# Patient Record
Sex: FEMALE | Race: WHITE | NOT HISPANIC OR LATINO | Employment: FULL TIME | URBAN - METROPOLITAN AREA
[De-identification: names, ages, dates, MRNs, and addresses within clinical notes are randomized per-mention and may not be internally consistent; named-entity substitution may affect disease eponyms.]

---

## 2020-08-04 ENCOUNTER — TELEPHONE (OUTPATIENT)
Dept: SCHEDULING | Facility: IMAGING CENTER | Age: 45
End: 2020-08-04

## 2020-09-20 NOTE — PROGRESS NOTES
Subjective:     CC:  Diagnoses of Need for vaccination, Wrist lesion, PCOS (polycystic ovarian syndrome), Screening for tuberculosis, and IUD (intrauterine device) in place were pertinent to this visit.    HISTORY OF THE PRESENT ILLNESS: Patient is a 45 y.o. female. This pleasant patient is here today to establish care and discuss wrist lesion. Her prior PCP was Dr. Amilcar Hdz.    Wrist lesion  This is a new condition.  Onset:several months ago.    Location: right posterior wrist  Duration: chronic, slightly decreasing in size  Quality: dose not hurt  Modifying factors: No known improving or worsening factors.  She reported it just appeared  Precipitating trauma: No known trauma  Associated symptoms: No weakness or numbness  Home treatments: none      PCOS (polycystic ovarian syndrome)  Chronic, improved with losing weight, exercise.  She uses prometrium around her cycles to help with dibilating menstrual pain. She does not know the dose she is taking.  She has an IUD.  Symptoms improve with exercise.    Screening for tuberculosis  No night sweats, fevers, cough.  No abnormal weight loss.  She would like TB testing for pre-employement evaluation.      Allergies: Patient has no allergy information on record.    Current Outpatient Medications Ordered in Epic   Medication Sig Dispense Refill   • Progesterone Micronized (PROMETRIUM PO) Take 1 Tab by mouth as needed.       No current Epic-ordered facility-administered medications on file.        History reviewed. No pertinent past medical history.    Past Surgical History:   Procedure Laterality Date   • CHOLECYSTECTOMY  2015       Social History     Tobacco Use   • Smoking status: Never Smoker   • Smokeless tobacco: Never Used   Substance Use Topics   • Alcohol use: Yes     Frequency: 2-3 times a week     Drinks per session: 1 or 2     Comment: 2 kombucha drinks   • Drug use: Never       Social History     Social History Narrative   • Not on file       Family  "History   Problem Relation Age of Onset   • Hypertension Mother    • Stroke Father 70   • Diabetes Father    • Heart Disease Maternal Grandmother    • Heart Disease Maternal Grandfather    • Stroke Paternal Grandfather    • Cancer Neg Hx        Health Maintenance: Pt reports getting pap and mammogram in 6/2020    ROS:   Gen: no fevers/chills, no changes in weight  Eyes: no changes in vision  ENT: no sore throat, no hearing loss, no bloody nose  Pulm: no sob, no cough  CV: no chest pain, no palpitations  GI: no nausea/vomiting, no diarrhea  : no dysuria  MSk: no myalgias  Skin: no rash  Neuro: no headaches, no numbness/tingling  Heme/Lymph: no easy bruising      Objective:     Exam: /68 (BP Location: Left arm, Patient Position: Sitting, BP Cuff Size: Adult)   Pulse 79   Temp 36.7 °C (98 °F) (Temporal)   Resp 16   Ht 1.702 m (5' 7\")   Wt 83.9 kg (185 lb)   SpO2 96%  Body mass index is 28.98 kg/m².    General: Normal appearing. No distress.  HEENT: Normocephalic.  Canals are clear bilaterally, tympanic membranes are benign,nasal and OP examinations deferred given COVID19 exposure risk  Eyes: Eyes conjunctiva clear lids without ptosis, pupils equal and reactive to light accommodation, ears normal shape and contour  Neck: Supple. Thyroid is not enlarged.  Pulmonary: Clear to ausculation.  Normal effort. No rales, ronchi, or wheezing.  Cardiovascular: Regular rate and rhythm without murmur.   Abdomen: Soft, nontender, nondistended. Normal bowel sounds. Liver and spleen are not palpable  Neurologic: No gross motor deficits  Lymph: No cervical or supraclavicular lymph nodes are palpable  Skin: Warm and dry.  No obvious lesions.  Musculoskeletal: Normal gait. No extremity cyanosis, clubbing, or edema.  Wrist/Hand: +right dorsal wrist localized swelling that is soft, round, nontender, nonerythematous. Tenderness to palpation negative. No tenderness at snuffbox. Range of motion intact. Strength and sensation " intact.  Good  strength. 2+ radial pulse.   Psych: Normal mood and affect. Alert and oriented x3. Judgment and insight is normal. PHQ9 = 0    Assessment & Plan:   45 y.o. female with the following -    1. Need for vaccination  Pt desires vaccination.  Risks and benefits discussed.  No contraindications today.  Vaccine given.  Follow-up precautions discussed.  - Influenza Vaccine Quad Injection (PF)    2. Wrist lesion  New issue, improving.  US ordered and plan for referral based on results.  - US-EXTREMITY NON VASCULAR UNILATERAL RIGHT; Future    Addendum: US consistent with cyst, likely ganglion cyst.  Referral for surgeon placed for drainage vs removal.    3. PCOS (polycystic ovarian syndrome)  Chronic, improved symptoms with diet and exercise and weight loss.  She has cramping that is severe around her periods but this improves with oral progesterone.  Requested her outside records prior to refilling.    4. Screening for tuberculosis  Pt is asymptomatic and needs screening for pre-employement.  PPD placed and pt to return in 2 days to have it read.  - PPD SKIN TEST    5. IUD (intrauterine device) in place  Pt has a copper IUD that needs removed.  Her partner has had a vasectomy, so she does not desire replacement birth control at this time.  - REFERRAL TO OB/GYN    Return in about 2 months (around 11/21/2020).    Please note that this dictation was created using voice recognition software. I have made every reasonable attempt to correct obvious errors, but I expect that there are errors of grammar and possibly content that I did not discover before finalizing the note.

## 2020-09-21 ENCOUNTER — OFFICE VISIT (OUTPATIENT)
Dept: MEDICAL GROUP | Facility: MEDICAL CENTER | Age: 45
End: 2020-09-21
Payer: COMMERCIAL

## 2020-09-21 VITALS
DIASTOLIC BLOOD PRESSURE: 68 MMHG | WEIGHT: 185 LBS | OXYGEN SATURATION: 96 % | RESPIRATION RATE: 16 BRPM | TEMPERATURE: 98 F | SYSTOLIC BLOOD PRESSURE: 100 MMHG | HEIGHT: 67 IN | BODY MASS INDEX: 29.03 KG/M2 | HEART RATE: 79 BPM

## 2020-09-21 DIAGNOSIS — Z11.1 SCREENING FOR TUBERCULOSIS: ICD-10-CM

## 2020-09-21 DIAGNOSIS — Z97.5 IUD (INTRAUTERINE DEVICE) IN PLACE: ICD-10-CM

## 2020-09-21 DIAGNOSIS — M25.9 WRIST LESION: Primary | ICD-10-CM

## 2020-09-21 DIAGNOSIS — E28.2 PCOS (POLYCYSTIC OVARIAN SYNDROME): ICD-10-CM

## 2020-09-21 DIAGNOSIS — Z23 NEED FOR VACCINATION: ICD-10-CM

## 2020-09-21 PROCEDURE — 90686 IIV4 VACC NO PRSV 0.5 ML IM: CPT | Performed by: FAMILY MEDICINE

## 2020-09-21 PROCEDURE — 90471 IMMUNIZATION ADMIN: CPT | Performed by: FAMILY MEDICINE

## 2020-09-21 PROCEDURE — 86580 TB INTRADERMAL TEST: CPT | Performed by: FAMILY MEDICINE

## 2020-09-21 PROCEDURE — 99203 OFFICE O/P NEW LOW 30 MIN: CPT | Mod: 25 | Performed by: FAMILY MEDICINE

## 2020-09-21 SDOH — HEALTH STABILITY: MENTAL HEALTH: HOW MANY STANDARD DRINKS CONTAINING ALCOHOL DO YOU HAVE ON A TYPICAL DAY?: 1 OR 2

## 2020-09-21 SDOH — HEALTH STABILITY: MENTAL HEALTH: HOW OFTEN DO YOU HAVE A DRINK CONTAINING ALCOHOL?: 2-3 TIMES A WEEK

## 2020-09-21 ASSESSMENT — PATIENT HEALTH QUESTIONNAIRE - PHQ9: CLINICAL INTERPRETATION OF PHQ2 SCORE: 0

## 2020-09-21 NOTE — ASSESSMENT & PLAN NOTE
Chronic, improved with losing weight, exercise.  She uses prometrium around her cycles to help with dibilating menstrual pain. She does not know the dose she is taking.  She has an IUD.  Symptoms improve with exercise.

## 2020-09-21 NOTE — LETTER
[facility]  Sia Flowers D.O.  [address]  [place] [address]  Fax: [phone]   Authorization for Release/Disclosure of   Protected Health Information   Name: LAUREL SILVEIRA : 1975 SSN: [id number]   Address: [address] [place] [address] [address] [place] [address] Phone:    [phone] (home)    I authorize the entity listed below to release/disclose the PHI below to:   [facility]/Sia Flowers D.O. and Sia Flowers D.O.   Provider or Entity Name:     Address   City, State, Zip   Phone:      Fax:     Reason for request: continuity of care   Information to be released:    [  ] LAST COLONOSCOPY,  including any PATH REPORT and follow-up  [  ] LAST FIT/COLOGUARD RESULT [  ] LAST DEXA  [  ] LAST MAMMOGRAM  [  ] LAST PAP  [  ] LAST LABS [  ] RETINA EXAM REPORT  [  ] IMMUNIZATION RECORDS  [  ] Release all info      [  ] Check here and initial the line next to each item to release ALL health information INCLUDING  _____ Care and treatment for drug and / or alcohol abuse  _____ HIV testing, infection status, or AIDS  _____ Genetic Testing    DATES OF SERVICE OR TIME PERIOD TO BE DISCLOSED: _____________  I understand and acknowledge that:  * This Authorization may be revoked at any time by you in writing, except if your health information has already been used or disclosed.  * Your health information that will be used or disclosed as a result of you signing this authorization could be re-disclosed by the recipient. If this occurs, your re-disclosed health information may no longer be protected by State or Federal laws.  * You may refuse to sign this Authorization. Your refusal will not affect your ability to obtain treatment.  * This Authorization becomes effective upon signing and will  on (date) __________.      If no date is indicated, this Authorization will  one (1) year from the signature date.    Name: Laurel Silveira    Signature:   Date:     2020       PLEASE FAX  REQUESTED RECORDS BACK TO: [phone]

## 2020-09-21 NOTE — ASSESSMENT & PLAN NOTE
No night sweats, fevers, cough.  No abnormal weight loss.  She would like TB testing for pre-employement evaluation.

## 2020-09-21 NOTE — LETTER
[facility]  Sia Flowers D.O.  [address]  [place] [address]  Fax: [phone]   Authorization for Release/Disclosure of   Protected Health Information   Name: LAUREL SILVEIRA : 1975 SSN: [id number]   Address: [address] [place] [address] [address] [place] [address] Phone:    [phone] (home)    I authorize the entity listed below to release/disclose the PHI below to:   [facility]/Sia Flowers D.O. and Sia Flowers D.O.   Provider or Entity Name:     Address   City, State, Zip   Phone:      Fax:     Reason for request: continuity of care   Information to be released:    [  ] LAST COLONOSCOPY,  including any PATH REPORT and follow-up  [  ] LAST FIT/COLOGUARD RESULT [  ] LAST DEXA  [  ] LAST MAMMOGRAM  [  ] LAST PAP  [  ] LAST LABS [  ] RETINA EXAM REPORT  [  ] IMMUNIZATION RECORDS  [  ] Release all info      [  ] Check here and initial the line next to each item to release ALL health information INCLUDING  _____ Care and treatment for drug and / or alcohol abuse  _____ HIV testing, infection status, or AIDS  _____ Genetic Testing    DATES OF SERVICE OR TIME PERIOD TO BE DISCLOSED: _____________  I understand and acknowledge that:  * This Authorization may be revoked at any time by you in writing, except if your health information has already been used or disclosed.  * Your health information that will be used or disclosed as a result of you signing this authorization could be re-disclosed by the recipient. If this occurs, your re-disclosed health information may no longer be protected by State or Federal laws.  * You may refuse to sign this Authorization. Your refusal will not affect your ability to obtain treatment.  * This Authorization becomes effective upon signing and will  on (date) __________.      If no date is indicated, this Authorization will  one (1) year from the signature date.    Name: Laurel Silveira    Signature:   Date:     2020       PLEASE FAX  REQUESTED RECORDS BACK TO: [phone]

## 2020-09-21 NOTE — ASSESSMENT & PLAN NOTE
This is a new condition.  Onset:several months ago.    Location: right posterior wrist  Duration: chronic, slightly decreasing in size  Quality: dose not hurt  Modifying factors: No known improving or worsening factors.  She reported it just appeared  Precipitating trauma: No known trauma  Associated symptoms: No weakness or numbness  Home treatments: none

## 2020-09-21 NOTE — LETTER
[facility]  Sia Flowers D.O.  [address]  [place] [address]  Fax: [phone]   Authorization for Release/Disclosure of   Protected Health Information   Name: LAUREL SILVEIRA : 1975 SSN: [id number]   Address: [address] [place] [address] [address] [place] [address] Phone:    [phone] (home)    I authorize the entity listed below to release/disclose the PHI below to:   [facility]/Sia Flowers D.O. and Sia Flowers D.O.   Provider or Entity Name:     Address   City, State, Zip   Phone:      Fax:     Reason for request: continuity of care   Information to be released:    [  ] LAST COLONOSCOPY,  including any PATH REPORT and follow-up  [  ] LAST FIT/COLOGUARD RESULT [  ] LAST DEXA  [  ] LAST MAMMOGRAM  [  ] LAST PAP  [  ] LAST LABS [  ] RETINA EXAM REPORT  [  ] IMMUNIZATION RECORDS  [  ] Release all info      [  ] Check here and initial the line next to each item to release ALL health information INCLUDING  _____ Care and treatment for drug and / or alcohol abuse  _____ HIV testing, infection status, or AIDS  _____ Genetic Testing    DATES OF SERVICE OR TIME PERIOD TO BE DISCLOSED: _____________  I understand and acknowledge that:  * This Authorization may be revoked at any time by you in writing, except if your health information has already been used or disclosed.  * Your health information that will be used or disclosed as a result of you signing this authorization could be re-disclosed by the recipient. If this occurs, your re-disclosed health information may no longer be protected by State or Federal laws.  * You may refuse to sign this Authorization. Your refusal will not affect your ability to obtain treatment.  * This Authorization becomes effective upon signing and will  on (date) __________.      If no date is indicated, this Authorization will  one (1) year from the signature date.    Name: Laurel Silveira    Signature:   Date:     2020       PLEASE FAX  REQUESTED RECORDS BACK TO: [phone]

## 2020-09-24 ENCOUNTER — NON-PROVIDER VISIT (OUTPATIENT)
Dept: MEDICAL GROUP | Facility: MEDICAL CENTER | Age: 45
End: 2020-09-24
Payer: COMMERCIAL

## 2020-09-24 DIAGNOSIS — Z11.1 ENCOUNTER FOR PPD SKIN TEST READING: ICD-10-CM

## 2020-09-24 LAB — TB WHEAL 3D P 5 TU DIAM: NORMAL MM

## 2020-09-24 PROCEDURE — 99999 PR NO CHARGE: CPT | Performed by: FAMILY MEDICINE

## 2020-09-24 NOTE — PROGRESS NOTES
Dolores Alberto is a 45 y.o. female here for a non-provider visit for PPD reading -- Step 1 of 1.      1.  Resulted in Epic under enter/edit results? Yes   2.  TB evaluation questionnaire scanned into chart and original given to patient?Yes      3. Was induration greater than 0 mm? No.        Routed to PCP? Yes

## 2020-09-25 ENCOUNTER — TELEPHONE (OUTPATIENT)
Dept: MEDICAL GROUP | Facility: MEDICAL CENTER | Age: 45
End: 2020-09-25

## 2020-09-25 NOTE — TELEPHONE ENCOUNTER
----- Message from Sia Flowers D.O. sent at 9/24/2020  8:50 PM PDT -----  Please let Dolores know that her TB screening test was negative. -Dr. Flowers

## 2020-09-30 ENCOUNTER — GYNECOLOGY VISIT (OUTPATIENT)
Dept: OBGYN | Facility: CLINIC | Age: 45
End: 2020-09-30
Payer: COMMERCIAL

## 2020-09-30 VITALS — WEIGHT: 185 LBS | DIASTOLIC BLOOD PRESSURE: 75 MMHG | SYSTOLIC BLOOD PRESSURE: 127 MMHG | BODY MASS INDEX: 28.98 KG/M2

## 2020-09-30 DIAGNOSIS — Z00.00 ANNUAL PHYSICAL EXAM: ICD-10-CM

## 2020-09-30 PROCEDURE — 99386 PREV VISIT NEW AGE 40-64: CPT | Performed by: OBSTETRICS & GYNECOLOGY

## 2020-09-30 NOTE — PROGRESS NOTES
Dolores Alberto is a 45 y.o.  female who presents for her Annual Gynecologic Exam      HPI Comments: Pt presents for her annual well woman exam.  Recently moved to [place].  Here to establish care and refill Prometrium for which she takes to control her bleeding on her IUD.    Review of Systems:   Pertinent positives documented in HPI and all other systems reviewed & are negative    OB History    Para Term  AB Living   2 0 0 0 2 0   SAB TAB Ectopic Molar Multiple Live Births   0 2 0 0 0 0       Past Gynecological History  Patient's last menstrual period was 2020.  BC or HRT?:  IUD  Menarche: 15  Menopausal?:  Denies Postmeopausal bleeding ever?:  Denies  Menses: None  Sexually active: Yes  Number of lifetime sexual partners: 1  Sexually transmitted infections: Denies  Pap: last 2020, denies hx of abnormal  Symptoms of overactive bladder: Denies  Symptoms of stress incontinence: Denies  Symptoms of bowel incontinence: Denies  Feeling of vaginal bulge: Denies  Mammogram: up-to-date  History of sexual abuse: Denies  Fibroids?:  Denies  Ovarian Cysts?:  Denies      All PMH, PSH, allergies, social history and FH reviewed and updated today:  History reviewed. No pertinent past medical history.  Past Surgical History:   Procedure Laterality Date   • CHOLECYSTECTOMY       Medications:   No current [facility]-ordered outpatient medications on file.     No current [facility]-ordered facility-administered medications on file.      Latex  Social History     Socioeconomic History   • Marital status: Single     Spouse name: Not on file   • Number of children: Not on file   • Years of education: Not on file   • Highest education level: Not on file   Occupational History   • Not on file   Social Needs   • Financial resource strain: Not on file   • Food insecurity     Worry: Not on file     Inability: Not on file   • Transportation needs     Medical: Not on file     Non-medical: Not on file   Tobacco Use   •  Smoking status: Never Smoker   • Smokeless tobacco: Never Used   Substance and Sexual Activity   • Alcohol use: Yes     Frequency: 2-3 times a week     Drinks per session: 1 or 2     Comment: 2 kombucha drinks   • Drug use: Never   • Sexual activity: Not on file   Lifestyle   • Physical activity     Days per week: Not on file     Minutes per session: Not on file   • Stress: Not on file   Relationships   • Social connections     Talks on phone: Not on file     Gets together: Not on file     Attends Judaism service: Not on file     Active member of club or organization: Not on file     Attends meetings of clubs or organizations: Not on file     Relationship status: Not on file   • Intimate partner violence     Fear of current or ex partner: Not on file     Emotionally abused: Not on file     Physically abused: Not on file     Forced sexual activity: Not on file   Other Topics Concern   • Not on file   Social History Narrative   • Not on file     Family History   Problem Relation Age of Onset   • Hypertension Mother    • Stroke Father 70   • Diabetes Father    • Heart Disease Maternal Grandmother    • Heart Disease Maternal Grandfather    • Stroke Paternal Grandfather    • Cancer Neg Hx           Objective:   Vital measurements:  /75   Wt 83.9 kg (185 lb)   Body mass index is 28.98 kg/m². (Goal BM I>18 <25)    Physical Exam   Nursing note and vitals reviewed.  Constitutional: She is oriented to person, place, and time. She appears well-developed and well-nourished. No distress.     HEENT:   Head: Normocephalic and atraumatic.   Right Ear: External ear normal.   Left Ear: External ear normal.   Nose: Nose normal.   Eyes: Conjunctivae and EOM are normal. Pupils are equal, round, and reactive to light. No scleral icterus.     Neck: Normal range of motion. Neck supple. No tracheal deviation present. No thyromegaly present. No cervical or supraclavicular lymphadenopathy.    Pulmonary/Chest: Effort normal and breath  sounds normal. No respiratory distress. She has no wheezes. She has no rales. She exhibits no tenderness.     Cardiovascular: Regular, rate and rhythm. No edema.    Breast:  Symmetrical, normal consistency without masses., No dimpling or skin changes, Normal nipples without discharge, no axillary lymphadenopathy    Abdominal: Soft. Bowel sounds are normal. She exhibits no distension and no mass. No tenderness. She has no rebound and no guarding.     Genitourinary:  Pelvic exam was performed with patient supine.  External genitalia with no abnormal pigmentation, labial fusion, rash, tenderness, lesion or injury to the labia bilaterally.  BUS normal  Vagina is pink and moist with no lesions, foul discharge, erythema, tenderness or bleeding. No foreign body around the vagina or signs of injury.   Cervix exhibits no motion tenderness, no discharge and no friability, no lesions.   Uterus is not deviated, not enlarged, not fixed and not tender.  Right adnexa displays no mass, no tenderness and no fullness.  Left adnexa displays no mass, no tenderness and no fullness.     Musculoskeletal: Normal range of motion. Non tender. She exhibits no edema and no tenderness.     Lymphadenopathy: She has no cervical or supraclavicular adenopathy.     Neurological: She is alert and oriented to person, place, and time. She exhibits normal muscle tone.     Skin: Skin is warm and dry. No rash noted. She is not diaphoretic. No erythema. No pallor.     Psychiatric: She has a normal mood and affect. Her behavior is normal. Judgment and thought content normal.        Assessment:     1. Annual physical exam           Plan:   Today we specifically addressed her Prometrium and the plan is to continue medication as prescribed    Pap and physical exam performed  Counseling: breast self exam, mammography screening, use and side effects of OCPs and adequate intake of calcium and vitamin D  Encouraged exercise and proper diet.  Mammograms annually.  Patient given order for screening kaitlyn mammogram.  Weight bearing exercise daily to strengthen bones  Calcium 1200mg daily and vit D 800 IU daily

## 2020-09-30 NOTE — NON-PROVIDER
Pt here NP exam  Pt states est care from [facility], needs refill of progesterone  Good#[phone]  Pharmacy verified

## 2020-10-01 ENCOUNTER — HOSPITAL ENCOUNTER (OUTPATIENT)
Dept: RADIOLOGY | Facility: MEDICAL CENTER | Age: 45
End: 2020-10-01
Attending: FAMILY MEDICINE
Payer: COMMERCIAL

## 2020-10-01 DIAGNOSIS — M25.9 WRIST LESION: ICD-10-CM

## 2020-10-01 PROCEDURE — 76882 US LMTD JT/FCL EVL NVASC XTR: CPT | Mod: RT

## 2020-10-01 NOTE — TELEPHONE ENCOUNTER
Phone Number called: [phone] (home)   Message: Left pt a mess to call back regarding the TB test vial that was used had a weakened test. Pt may opt to re test.

## 2020-10-06 ENCOUNTER — TELEPHONE (OUTPATIENT)
Dept: MEDICAL GROUP | Facility: MEDICAL CENTER | Age: 45
End: 2020-10-06

## 2020-10-06 NOTE — TELEPHONE ENCOUNTER
Phone Number Called: [phone] (home)     Call outcome: Did not leave a detailed message. Requested patient to call back.    Message: Asked pt to call back for US results.

## 2020-10-06 NOTE — TELEPHONE ENCOUNTER
----- Message from Sia Flowers D.O. sent at 10/2/2020  2:55 PM PDT -----  Please call Dolores and let her know that her hand US shows that she has a benign appearing complicated cyst on her wrist that could be a ganglion cyst.  The lesion is not a solid mass and does not show evidence of inflammation  I recommend she see a hand surgeon to discuss draining the cyst vs removal.  I have placed a referral for her. -Dr. Sia Flowers

## 2020-10-07 NOTE — TELEPHONE ENCOUNTER
Phone Number Called: [phone] (home)     Call outcome: Did not leave a detailed message. Requested patient to call back.    Message: 2nd attempt, regarding US results.

## 2020-11-29 NOTE — PROGRESS NOTES
Subjective:     CC: arm pain desires PT referral    HPI:   Dolores presents today with     Acute pain of right shoulder  This is a new condition.  Onset: 1.5 months  Location: right shoulder  Duration: intermittent  Quality:very sharp and achy pain  Modifying factors: wakes up with pain  Precipitating trauma: she fell 13-14 years down stairs and she had pain in her shoulder at that time which improved s/p PT.  However 1.5 months ago, she started sleeping on that side and was doing resistance training and symptoms worsen.  Associated symptoms: Pain radiates from her right shoulder to her right forearm and wrist and along her bicep.  Her arm feels week and stiff.  No rashes.  She initially had neck pain for which she went to the chiropractor and that improved but her shoulder pain continued.  Home treatments: improves slightly with IBU 800mg but sometimes still has severe pain for which she takes tramadol which she was given in 2016.  She has been taking 0.5-1.5 tablets several days per week but not every day for severe pain and this controls symptoms well with no negative SEs.        History reviewed. No pertinent past medical history.    Social History     Tobacco Use   • Smoking status: Never Smoker   • Smokeless tobacco: Never Used   Substance Use Topics   • Alcohol use: Yes     Frequency: 2-3 times a week     Drinks per session: 1 or 2     Comment: 2 kombucha drinks   • Drug use: Never       Current Outpatient Medications Ordered in Epic   Medication Sig Dispense Refill   • ibuprofen (MOTRIN) 800 MG Tab Take 1 Tab by mouth every 8 hours as needed. 30 Tab 0   • acetaminophen (TYLENOL) 500 MG Tab Take 1 Tab by mouth every 6 hours as needed. 30 Tab 0   • traMADol (ULTRAM) 50 MG Tab Take 0.5 Tabs by mouth every 8 hours as needed for Moderate Pain or Severe Pain for up to 30 days. 20 Tab 0     No current Epic-ordered facility-administered medications on file.        Allergies:  Latex    Health Maintenance: up to  "date- pt report normal mammogram and pap smear 2020    ROS:  Gen: no fevers/chills, no changes in weight  Eyes: no changes in vision  ENT: no sore throat, no hearing loss, no bloody nose  Pulm: no sob, no cough  CV: no chest pain, no palpitations  GI: no nausea/vomiting, no diarrhea  : no dysuria  MSk: no myalgias  Skin: no rash  Neuro: no headaches, no numbness/tingling  Heme/Lymph: no easy bruising      Objective:       Exam:  /70 (BP Location: Left arm, Patient Position: Sitting, BP Cuff Size: Adult)   Pulse 69   Temp 36.3 °C (97.3 °F) (Temporal)   Resp 14   Ht 1.702 m (5' 7\")   Wt 90.3 kg (199 lb)   LMP 11/21/2020   SpO2 97%   BMI 31.17 kg/m²  Body mass index is 31.17 kg/m².    Gen: Alert and oriented, No apparent distress.  Neck: Neck is supple without lymphadenopathy.  Lungs: Normal effort, CTA bilaterally, no wheezes, rhonchi, or rales  CV: Regular rate and rhythm. No murmurs, rubs, or gallops.  Ext: No clubbing, cyanosis, edema.  Right Shoulder: Full ROM, full strength, empty can test negative, drop arm test negative, Hawkin's positive.  Pain with palpation of bicep insertion site      Assessment & Plan:     45 y.o. female with the following -     1. Acute pain of right shoulder  New issue, stable.  Will check xray and Pt referral given.  Encouraged tylenol and IBU prn pain (SE profiles discussed) with rare tramadol use for breakthrough pain.  SE profile, risks and benefits of tramadol discussed.  PDMP reviewed without aberrant behavior noted.  Encouraged no alcohol or drug use while using tramadol.  Opiate prescription consent form signed.  Informed her not to drive or operate heavy machinery after taking tramadol.  Controlled substance treatment agreement signed.  Follow-up precautions given.  - REFERRAL TO PHYSICAL THERAPY  - DX-SHOULDER 2+ RIGHT; Future  - ibuprofen (MOTRIN) 800 MG Tab; Take 1 Tab by mouth every 8 hours as needed.  Dispense: 30 Tab; Refill: 0  - acetaminophen (TYLENOL) " 500 MG Tab; Take 1 Tab by mouth every 6 hours as needed.  Dispense: 30 Tab; Refill: 0  - traMADol (ULTRAM) 50 MG Tab; Take 0.5 Tabs by mouth every 8 hours as needed for Moderate Pain or Severe Pain for up to 30 days.  Dispense: 20 Tab; Refill: 0  - Consent for Opiate Prescription  - Controlled Substance Treatment Agreement      Return in about 6 weeks (around 1/11/2021) for Medication review.    Please note that this dictation was created using voice recognition software. I have made every reasonable attempt to correct obvious errors, but I expect that there are errors of grammar and possibly content that I did not discover before finalizing the note.

## 2020-11-30 ENCOUNTER — OFFICE VISIT (OUTPATIENT)
Dept: MEDICAL GROUP | Facility: MEDICAL CENTER | Age: 45
End: 2020-11-30
Payer: COMMERCIAL

## 2020-11-30 VITALS
BODY MASS INDEX: 31.23 KG/M2 | RESPIRATION RATE: 14 BRPM | WEIGHT: 199 LBS | TEMPERATURE: 97.3 F | OXYGEN SATURATION: 97 % | HEART RATE: 69 BPM | DIASTOLIC BLOOD PRESSURE: 70 MMHG | SYSTOLIC BLOOD PRESSURE: 108 MMHG | HEIGHT: 67 IN

## 2020-11-30 DIAGNOSIS — M25.511 ACUTE PAIN OF RIGHT SHOULDER: ICD-10-CM

## 2020-11-30 PROCEDURE — 99214 OFFICE O/P EST MOD 30 MIN: CPT | Performed by: FAMILY MEDICINE

## 2020-11-30 RX ORDER — ACETAMINOPHEN 500 MG
500 TABLET ORAL EVERY 6 HOURS PRN
Qty: 30 TAB | Refills: 0 | Status: SHIPPED | OUTPATIENT
Start: 2020-11-30 | End: 2022-07-06

## 2020-11-30 RX ORDER — TRAMADOL HYDROCHLORIDE 50 MG/1
25 TABLET ORAL EVERY 8 HOURS PRN
Qty: 20 TAB | Refills: 0 | Status: SHIPPED | OUTPATIENT
Start: 2020-11-30 | End: 2020-12-30

## 2020-11-30 RX ORDER — IBUPROFEN 800 MG/1
800 TABLET ORAL EVERY 8 HOURS PRN
Qty: 30 TAB | Refills: 0 | Status: SHIPPED | OUTPATIENT
Start: 2020-11-30 | End: 2022-03-29

## 2020-11-30 NOTE — ASSESSMENT & PLAN NOTE
This is a new condition.  Onset: 1.5 months  Location: right shoulder  Duration: intermittent  Quality:very sharp and achy pain  Modifying factors: wakes up with pain  Precipitating trauma: she fell 13-14 years down stairs and she had pain in her shoulder at that time which improved s/p PT.  However 1.5 months ago, she started sleeping on that side and was doing resistance training and symptoms worsen.  Associated symptoms: Pain radiates from her right shoulder to her right forearm and wrist and along her bicep.  Her arm feels week and stiff.  No rashes.  She initially had neck pain for which she went to the chiropractor and that improved but her shoulder pain continued.  Home treatments: improves slightly with IBU 800mg but sometimes still has severe pain for which she takes tramadol which she was given in 2016.  She has been taking 0.5-1.5 tablets several days per week but not every day for severe pain and this controls symptoms well with no negative SEs.

## 2020-12-14 ENCOUNTER — APPOINTMENT (OUTPATIENT)
Dept: PHYSICAL THERAPY | Facility: REHABILITATION | Age: 45
End: 2020-12-14
Attending: FAMILY MEDICINE
Payer: COMMERCIAL

## 2020-12-17 ENCOUNTER — APPOINTMENT (OUTPATIENT)
Dept: PHYSICAL THERAPY | Facility: REHABILITATION | Age: 45
End: 2020-12-17
Payer: COMMERCIAL

## 2020-12-21 ENCOUNTER — APPOINTMENT (OUTPATIENT)
Dept: PHYSICAL THERAPY | Facility: REHABILITATION | Age: 45
End: 2020-12-21
Payer: COMMERCIAL

## 2020-12-24 ENCOUNTER — APPOINTMENT (OUTPATIENT)
Dept: PHYSICAL THERAPY | Facility: REHABILITATION | Age: 45
End: 2020-12-24
Payer: COMMERCIAL

## 2021-01-04 ENCOUNTER — APPOINTMENT (OUTPATIENT)
Dept: PHYSICAL THERAPY | Facility: REHABILITATION | Age: 46
End: 2021-01-04
Payer: COMMERCIAL

## 2021-01-04 ENCOUNTER — GYNECOLOGY VISIT (OUTPATIENT)
Dept: OBGYN | Facility: CLINIC | Age: 46
End: 2021-01-04
Payer: COMMERCIAL

## 2021-01-04 VITALS — DIASTOLIC BLOOD PRESSURE: 77 MMHG | BODY MASS INDEX: 30.38 KG/M2 | SYSTOLIC BLOOD PRESSURE: 112 MMHG | WEIGHT: 194 LBS

## 2021-01-04 DIAGNOSIS — Z30.432 ENCOUNTER FOR IUD REMOVAL: ICD-10-CM

## 2021-01-04 DIAGNOSIS — Z12.31 ENCOUNTER FOR SCREENING MAMMOGRAM FOR MALIGNANT NEOPLASM OF BREAST: ICD-10-CM

## 2021-01-04 PROCEDURE — 58301 REMOVE INTRAUTERINE DEVICE: CPT | Performed by: OBSTETRICS & GYNECOLOGY

## 2021-01-05 NOTE — PROCEDURES
IUD removal:    Speculum placed in the vagina and cervix visualized. IUD strings seen. IUD strings grasped with ring forceps and removed intact. Hemostasis noted. Pt tolerated procedure well.

## 2021-01-07 ENCOUNTER — APPOINTMENT (OUTPATIENT)
Dept: PHYSICAL THERAPY | Facility: REHABILITATION | Age: 46
End: 2021-01-07
Payer: COMMERCIAL

## 2021-06-07 ENCOUNTER — HOSPITAL ENCOUNTER (OUTPATIENT)
Dept: RADIOLOGY | Facility: MEDICAL CENTER | Age: 46
End: 2021-06-07
Attending: OBSTETRICS & GYNECOLOGY
Payer: COMMERCIAL

## 2021-06-07 DIAGNOSIS — Z12.31 ENCOUNTER FOR SCREENING MAMMOGRAM FOR MALIGNANT NEOPLASM OF BREAST: ICD-10-CM

## 2021-06-07 PROCEDURE — 77063 BREAST TOMOSYNTHESIS BI: CPT

## 2022-02-24 ENCOUNTER — OFFICE VISIT (OUTPATIENT)
Dept: MEDICAL GROUP | Facility: MEDICAL CENTER | Age: 47
End: 2022-02-24
Payer: COMMERCIAL

## 2022-02-24 VITALS
WEIGHT: 185 LBS | BODY MASS INDEX: 29.03 KG/M2 | HEART RATE: 60 BPM | HEIGHT: 67 IN | TEMPERATURE: 97.3 F | DIASTOLIC BLOOD PRESSURE: 78 MMHG | OXYGEN SATURATION: 95 % | SYSTOLIC BLOOD PRESSURE: 112 MMHG

## 2022-02-24 DIAGNOSIS — M54.31 RIGHT SCIATIC NERVE PAIN: ICD-10-CM

## 2022-02-24 PROCEDURE — 99214 OFFICE O/P EST MOD 30 MIN: CPT | Performed by: PHYSICIAN ASSISTANT

## 2022-02-24 RX ORDER — IBUPROFEN 800 MG/1
800 TABLET ORAL EVERY 8 HOURS PRN
Qty: 30 TABLET | Refills: 1 | Status: SHIPPED | OUTPATIENT
Start: 2022-02-24 | End: 2022-03-29

## 2022-02-24 RX ORDER — CYCLOBENZAPRINE HCL 10 MG
10 TABLET ORAL 3 TIMES DAILY PRN
Qty: 30 TABLET | Refills: 0 | Status: SHIPPED | OUTPATIENT
Start: 2022-02-24

## 2022-02-24 RX ORDER — ESTRADIOL 0.04 MG/D
FILM, EXTENDED RELEASE TRANSDERMAL
COMMUNITY
Start: 2022-02-03

## 2022-02-25 NOTE — PROGRESS NOTES
Subjective     Dolores Alberto is a 46 y.o. female who presents with Back Pain (Sciatica, Requesting muscle relaxer x 1 week)          Chief Complaint   Patient presents with   • Back Pain     Sciatica, Requesting muscle relaxer x 1 week       HPIPatient presents with complaint of one week of right sided sciatic pain. No injury. Just sitting in her work chair and the pain started. Seeing chiropractor who thinks it is a strained or inflammed piriformis muscle. Working on stretching and exercise. Requesting anti-inflammatory and muscle relaxant. No h/o major spine injury, spine surgery, malignancy. No recent illness. No weakness. No bladder/bowel dysfunction. No saddle anesthesia.    ROSno weight change. No fever/chills. No neck pain or stiffness. No rash or skin lesion. No n/v/d/c or abdominal pain. No urinary symptoms.        Active Ambulatory Problems     Diagnosis Date Noted   • Wrist lesion 09/21/2020   • PCOS (polycystic ovarian syndrome) 09/21/2020   • Screening for tuberculosis 09/21/2020   • Acute pain of right shoulder 11/30/2020     Resolved Ambulatory Problems     Diagnosis Date Noted   • No Resolved Ambulatory Problems     No Additional Past Medical History     Current Outpatient Medications   Medication Sig Dispense Refill   • estradiol (VIVELLE) 0.0375 MG/24HR patch APPLY TRANSDERMALLY TWICE A WEEK AS DIRECTED     • ibuprofen (MOTRIN) 800 MG Tab Take 1 Tablet by mouth every 8 hours as needed. 30 Tablet 1   • cyclobenzaprine (FLEXERIL) 10 mg Tab Take 1 Tablet by mouth 3 times a day as needed. 30 Tablet 0   • ibuprofen (MOTRIN) 800 MG Tab Take 1 Tab by mouth every 8 hours as needed. 30 Tab 0   • acetaminophen (TYLENOL) 500 MG Tab Take 1 Tab by mouth every 6 hours as needed. 30 Tab 0     No current facility-administered medications for this visit.   allergy to latex  Non-smoker, works as therapist      Objective     /78 (BP Location: Right arm, Patient Position: Sitting, BP Cuff Size: Adult long)   " Pulse 60   Temp 36.3 °C (97.3 °F) (Temporal)   Ht 1.702 m (5' 7\")   Wt 83.9 kg (185 lb) Comment: Pt reported  LMP 02/20/2022   SpO2 95%   Breastfeeding No   BMI 28.98 kg/m²      Physical Exam  Vitals and nursing note reviewed.   Constitutional:       General: She is not in acute distress.     Appearance: Normal appearance. She is not ill-appearing, toxic-appearing or diaphoretic.   Musculoskeletal:        Legs:    Skin:     General: Skin is warm and dry.      Coloration: Skin is not pale.      Findings: No rash.   Neurological:      General: No focal deficit present.      Mental Status: She is alert and oriented to person, place, and time.   Psychiatric:         Mood and Affect: Mood normal.         Behavior: Behavior normal.         Thought Content: Thought content normal.         Judgment: Judgment normal.                             Assessment & Plan        1. Right sciatic nerve pain    - ibuprofen (MOTRIN) 800 MG Tab; Take 1 Tablet by mouth every 8 hours as needed.  Dispense: 30 Tablet; Refill: 1  - cyclobenzaprine (FLEXERIL) 10 mg Tab; Take 1 Tablet by mouth 3 times a day as needed.  Dispense: 30 Tablet; Refill: 0     f/u with chiropractor and Dr. Flowers, ER with severe worsening or new weakness           "

## 2022-03-14 ENCOUNTER — PATIENT MESSAGE (OUTPATIENT)
Dept: MEDICAL GROUP | Facility: MEDICAL CENTER | Age: 47
End: 2022-03-14
Payer: COMMERCIAL

## 2022-03-14 DIAGNOSIS — M54.31 RIGHT SCIATIC NERVE PAIN: ICD-10-CM

## 2022-03-14 NOTE — TELEPHONE ENCOUNTER
2017       RE: Elise Bernard  [address] [place] [address]     Dear Colleague,    Thank you for referring your patient, Elise Bernard, to the [facility] NEUROLOGY at [facility]. Please see a copy of my visit note below.    [facility] NEUROLOGY  [address]  [address] [facility] [address]  Dept: [phone]  ______________________________________________________________________________    Patient: Elise Bernard   : 1953   MRN: [id number]   2017    INVASIVE PROCEDURE SAFETY CHECKLIST    Date: 2017   Procedure:Diagnostic Lumbar Puncture  Patient Name: Elise Bernard  MRN: [id number]  YOB: 1953    Action: Complete sections as appropriate. Any discrepancy results in a HARD COPY until resolved.     PRE PROCEDURE:  Patient ID verified with 2 identifiers (name and  or MRN): Yes  Procedure and site verified with patient/designee (when able): Yes  Accurate consent documentation in medical record: Yes  H&P (or appropriate assessment) documented in medical record: Yes  H&P must be up to 20 days prior to procedure and updates within 24 hours of procedure as applicable: Yes  Relevant diagnostic and radiology test results appropriately labeled and displayed as applicable: Yes  Procedure site(s) marked with provider initials: NA    TIMEOUT:  Time-Out performed immediately prior to starting procedure, including verbal and active participation of all team members addressing the following:Yes  * Correct patient identify  * Confirmed that the correct side and site are marked  * An accurate procedure consent form  * Agreement on the procedure to be done  * Correct patient position  * Relevant images and results are properly labeled and appropriately displayed  * The need to administer antibiotics or fluids for irrigation purposes during the procedure as applicable   * Safety precautions based on patient history or  From: Dolores Alberto  To: Physician Assistant Alma Ellington  Sent: 3/14/2022 12:31 PM PDT  Subject: Re: referral for an MRI    Heellie Marquez,  My back/hip pain is much less but I’m still having trouble sitting down for extended periods of time and would like a referral to an Orthopedic doctor to investigate further. Could you make that referral? Also, my insurance is [place] [employer].  Thanks   medication use    DURING PROCEDURE: Verification of correct person, site, and procedures any time the responsibility for care of the patient is transferred to another member of the care team.               PURPOSE OF VISIT: Diagnostic lumbar puncture.   REQUESTING PHYSICIAN: Dr Mullins  INDICATION: Clinical evaluation of dementia and behavior changes.   CONTRAINDICATIONS: This individual has no sensitivity to topical iodine or allergy to local anesthetic. She is not on any anticoagulation medications. She has no history of bleeding diathesis or coagulopathy.   CONSENT: This was obtained directly from the patient and  and signed by patient's  who is patient's POA and  as documented on the [facility] Affirmation of Consent Surgery or Invasive Procedure.   PATIENT SAFETY: Invasive procedure safety check was successfully completed.   : Justine ELLIS CNP  ASSISTANT: Jeronimo De Leon MD  DESCRIPTION: Elise Bernard was positioned in the left lateral decubitus. The lumbar skin was prepared with an iodine-containing solution at the mid lumbar level. Five  cubic centimeters of 1%  lidocaine  was infiltrated locally at the L4-L5 interspace level initially and repeated five cubic centimeters of 1% lidocaine once due to patient's discomfort . The lumbar subarachnoid space was not entered on the third pass by utilizing a 3-1/2 inch, 22 gauge, pencil-point Jamaal needle. The above mentioned space was entered on the second pass with 3-1/2 inch, 20 gauge Quincke needle. There was an initial show of pink tinge, which cleared rapidly. The opening pressure was eleven cm CSF. The CSF meniscus exhibited good respiratory and cardiac dynamics. Ten milliliters of spinal fluid was removed and sent to the lab for tests per Dr. Mullins request, see Epic for orders.  COMPLICATIONS: None were identified.   HEALTH LITERACY: The concept and management of post lumbar puncture headache were discussed with the  patient and her  prior to the start of the lumbar puncture.   DISPOSITION: Patient left the Clinic in no acute distress. Patient was accompanied by her home nurse/assitent Zhen and clinic RN to the laboratory. Study results will be conveyed to the patient by Dr. Mullins    This procedure was performed under a hospital privileging agreement with Dr. Decker, Neurologist     Justine ELLIS, [facility]  Neurology Clinic

## 2022-03-14 NOTE — PATIENT COMMUNICATION
1. Caller Name: Dolores Alberto                        Call Back Number: [phone] (home)       How would the patient prefer to be contacted with a response: Korbitechart message    2. SPECIFIC Action To Be Taken: Referral pending, please sign.    3. Diagnosis/Clinical Reason for Request: Back & Hip Pain    4. Specialty & Provider Name/Lab/Imaging Location: Orthopedic    5. Is appointment scheduled for requested order/referral: no    Patient was informed they will receive a return phone call from the office ONLY if there are any questions before processing their request. Advised to call back if they haven't received a call from the referral department in 5 days.

## 2022-03-29 DIAGNOSIS — M54.31 RIGHT SCIATIC NERVE PAIN: ICD-10-CM

## 2022-03-29 RX ORDER — IBUPROFEN 800 MG/1
800 TABLET ORAL EVERY 8 HOURS PRN
Qty: 30 TABLET | Refills: 0 | Status: SHIPPED | OUTPATIENT
Start: 2022-03-29 | End: 2022-07-06

## 2022-06-07 ENCOUNTER — PATIENT MESSAGE (OUTPATIENT)
Dept: MEDICAL GROUP | Facility: MEDICAL CENTER | Age: 47
End: 2022-06-07
Payer: COMMERCIAL

## 2022-06-07 DIAGNOSIS — Z12.31 ENCOUNTER FOR SCREENING MAMMOGRAM FOR BREAST CANCER: ICD-10-CM

## 2022-06-09 ENCOUNTER — HOSPITAL ENCOUNTER (OUTPATIENT)
Dept: RADIOLOGY | Facility: MEDICAL CENTER | Age: 47
End: 2022-06-09
Attending: FAMILY MEDICINE
Payer: COMMERCIAL

## 2022-06-09 DIAGNOSIS — Z12.31 VISIT FOR SCREENING MAMMOGRAM: ICD-10-CM

## 2022-06-09 PROCEDURE — 77063 BREAST TOMOSYNTHESIS BI: CPT

## 2022-06-16 ENCOUNTER — HOSPITAL ENCOUNTER (OUTPATIENT)
Dept: RADIOLOGY | Facility: MEDICAL CENTER | Age: 47
End: 2022-06-16
Attending: FAMILY MEDICINE
Payer: COMMERCIAL

## 2022-06-16 DIAGNOSIS — R92.8 ABNORMAL MAMMOGRAM: ICD-10-CM

## 2022-06-16 PROCEDURE — G0279 TOMOSYNTHESIS, MAMMO: HCPCS

## 2022-06-16 PROCEDURE — 76642 ULTRASOUND BREAST LIMITED: CPT | Mod: LT

## 2022-06-17 ENCOUNTER — PATIENT MESSAGE (OUTPATIENT)
Dept: MEDICAL GROUP | Facility: MEDICAL CENTER | Age: 47
End: 2022-06-17
Payer: COMMERCIAL

## 2022-06-17 DIAGNOSIS — R92.8 ABNORMAL MAMMOGRAM: ICD-10-CM

## 2022-06-22 ENCOUNTER — PATIENT MESSAGE (OUTPATIENT)
Dept: MEDICAL GROUP | Facility: MEDICAL CENTER | Age: 47
End: 2022-06-22
Payer: COMMERCIAL

## 2022-07-01 ENCOUNTER — HOSPITAL ENCOUNTER (OUTPATIENT)
Facility: MEDICAL CENTER | Age: 47
End: 2022-07-01
Attending: OBSTETRICS & GYNECOLOGY
Payer: COMMERCIAL

## 2022-07-01 ENCOUNTER — GYNECOLOGY VISIT (OUTPATIENT)
Dept: OBGYN | Facility: CLINIC | Age: 47
End: 2022-07-01
Payer: COMMERCIAL

## 2022-07-01 VITALS — DIASTOLIC BLOOD PRESSURE: 73 MMHG | BODY MASS INDEX: 32.14 KG/M2 | WEIGHT: 205.2 LBS | SYSTOLIC BLOOD PRESSURE: 119 MMHG

## 2022-07-01 DIAGNOSIS — Z01.419 WOMEN'S ANNUAL ROUTINE GYNECOLOGICAL EXAMINATION: ICD-10-CM

## 2022-07-01 DIAGNOSIS — Z79.890 HORMONE REPLACEMENT THERAPY: ICD-10-CM

## 2022-07-01 DIAGNOSIS — R92.30 BREAST DENSITY: ICD-10-CM

## 2022-07-01 PROCEDURE — 99396 PREV VISIT EST AGE 40-64: CPT | Performed by: OBSTETRICS & GYNECOLOGY

## 2022-07-01 PROCEDURE — 87624 HPV HI-RISK TYP POOLED RSLT: CPT

## 2022-07-01 PROCEDURE — 88175 CYTOPATH C/V AUTO FLUID REDO: CPT

## 2022-07-01 RX ORDER — TESTOSTERONE 40.5 MG/2.5G
4 GEL TOPICAL
COMMUNITY
End: 2022-07-06

## 2022-07-01 RX ORDER — ANTIARTHRITIC COMBINATION NO.2 900 MG
25 TABLET ORAL DAILY
COMMUNITY

## 2022-07-01 RX ORDER — MULTIVIT WITH MINERALS/LUTEIN
TABLET ORAL
COMMUNITY

## 2022-07-01 RX ORDER — PROGESTERONE 100 MG/1
CAPSULE ORAL
COMMUNITY
Start: 2022-06-16

## 2022-07-01 NOTE — PROGRESS NOTES
"Subjective:     CC:   Chief Complaint   Patient presents with   • Gynecologic Exam     Annual exam       HPI:   Dolores Alberto is a 47 y.o. female who presents for annual exam. Pt reports she is taking estrodiol patch and progesterone for PMS symptoms that were occurring 3/4 weeks a month along with testerone. She has \"homone\" testing with an NP who is manageing this. She recently went for a mammogram and a area of breast density was found. This made her nervouse so she stopped the estriold patch about 3-4 weeks ago. She has remained on the progesterone. She reprots since being off the estrogen she has not noted feeling any different, no worsening symptoms. She reports regular monthly periods. Denies any vasomotor symptoms. Pt also states she will be seeing her PCP soon as will be getting an FSH test to assess where she is with perimenopause.     Last Pap Smear: , cytology only, nml.   H/O Abnormal Pap: No  Last Mammogram: 2022  Last Tdap: ? Needs updated  Received HPV series: Aged out    Exercise: moderate exercise 60 mins 5/day weeks   Diet: Mostly WFPB eater, occasional meat.     No LMP recorded.  Hx STDs: No  Birth control: vasectomy  Denies abn vaginal diachre, itching, or odor   Menses every month with 3-4days days with heavy for the first 1-2 days.  No significant bloating/fluid retention, pelvic pain, or dyspareunia. No abnormal vaginal discharge.  No breast tenderness, mass, nipple discharge, changes in size or contour, or abnormal cyclic discomfort.    OB History    Para Term  AB Living   2 0 0 0 2 0   SAB IAB Ectopic Molar Multiple Live Births   0 2 0 0 0 0      She  has no history on file for sexual activity.    She  has no past medical history of Addisons disease ([facility]), Adrenal disorder ([facility]), Allergy, Anemia, Anxiety, Arrhythmia, Arthritis, Asthma, Blood transfusion without reported diagnosis, Cancer ([facility]), Cataract, CHF (congestive heart failure) ([facility]), Clotting disorder " (HCC), COPD (chronic obstructive pulmonary disease) (HCC), Cushings syndrome (HCC), Depression, Diabetes (HCC), Diabetic neuropathy (HCC), GERD (gastroesophageal reflux disease), Glaucoma, Goiter, Head ache, Heart attack (HCC), Heart murmur, HIV (human immunodeficiency virus infection) (HCC), Hyperlipidemia, Hypertension, IBD (inflammatory bowel disease), Kidney disease, Meningitis, Migraine, Muscle disorder, Osteoporosis, Parathyroid disorder (HCC), Pituitary disease (HCC), Pulmonary emphysema (HCC), Seizure (HCC), Sickle cell disease (HCC), Stroke (HCC), Substance abuse (HCC), Thyroid disease, Tuberculosis, or Urinary tract infection.  She  has a past surgical history that includes cholecystectomy (2015).    Family History   Problem Relation Age of Onset   • Hypertension Mother    • Stroke Father 70   • Diabetes Father    • Heart Disease Maternal Grandmother    • Heart Disease Maternal Grandfather    • Stroke Paternal Grandfather    • Cancer Neg Hx      Social History     Tobacco Use   • Smoking status: Never Smoker   • Smokeless tobacco: Never Used   Vaping Use   • Vaping Use: Never used   Substance Use Topics   • Alcohol use: Yes     Comment: 2 kombucha drinks   • Drug use: Never       Patient Active Problem List    Diagnosis Date Noted   • Acute pain of right shoulder 11/30/2020   • Wrist lesion 09/21/2020   • PCOS (polycystic ovarian syndrome) 09/21/2020   • Screening for tuberculosis 09/21/2020     Current Outpatient Medications   Medication Sig Dispense Refill   • Testosterone 4 MG/24HR PATCH 24 HR Place  on the skin.     • L-METHYLFOLATE PO Take 1,700 mg by mouth every day.     • Selenium 200 MCG Tab Take 200 mcg by mouth every day.     • Ascorbic Acid (VITAMIN C) 1000 MG Tab Take  by mouth.     • DHEA 25 MG Tab Take 25 mg by mouth every day.     • ibuprofen (MOTRIN) 800 MG Tab TAKE 1 TABLET BY MOUTH EVERY 8 HOURS AS NEEDED 30 Tablet 0   • progesterone (PROMETRIUM) 100 MG Cap TAKE 1 CAPSULE BY MOUTH AT  BEDTIME ON DAYS 14-21 AND TAKE 1 CAPSULE TWICE A DAY ON DAYS 22-28     • methylPREDNISolone (MEDROL DOSEPAK) 4 MG Tablet Therapy Pack Follow schedule on package instructions. 21 Tablet 0   • estradiol (VIVELLE) 0.0375 MG/24HR patch APPLY TRANSDERMALLY TWICE A WEEK AS DIRECTED (Patient not taking: Reported on 7/1/2022)     • cyclobenzaprine (FLEXERIL) 10 mg Tab Take 1 Tablet by mouth 3 times a day as needed. 30 Tablet 0   • acetaminophen (TYLENOL) 500 MG Tab Take 1 Tab by mouth every 6 hours as needed. 30 Tab 0     No current facility-administered medications for this visit.     Allergies   Allergen Reactions   • Latex Rash     rash       Review of Systems Neg  Constitutional: Negative for fever, chills and malaise/fatigue.   HENT: Negative for congestion.    Eyes: Negative for pain.   Respiratory: Negative for cough and shortness of breath.    Cardiovascular: Negative for chest pain and leg swelling.   Gastrointestinal: Negative for nausea, vomiting, abdominal pain and diarrhea.   Genitourinary: Negative for dysuria and hematuria.   Skin: Negative for rash.   Neurological: Negative for dizziness, focal weakness and headaches.   Endo/Heme/Allergies: Does not bruise/bleed easily.   Psychiatric/Behavioral: Negative for depression.  The patient is not nervous/anxious.      Objective:   /73 (BP Location: Left arm, Patient Position: Sitting, BP Cuff Size: Large adult)   Wt 93.1 kg (205 lb 3.2 oz)   BMI 32.14 kg/m²     Wt Readings from Last 4 Encounters:   07/01/22 93.1 kg (205 lb 3.2 oz)   04/19/22 81.6 kg (180 lb)   04/01/22 81.6 kg (180 lb)   02/24/22 83.9 kg (185 lb)       Physical Exam: NAD  Constitutional: Well-developed and well-nourished. Not diaphoretic. No distress.   Skin: Skin is warm and dry. No rash noted.  Head: Atraumatic without lesions.  Neck: Supple, trachea midline. Normal range of motion. No thyromegaly present. No lymphadenopathy--cervical or supraclavicular.  Respiratory: Effort normal. No  adventitious sounds.   Breast: Breasts examined seated and supine. No skin changes, peau d'orange or nipple retraction. No discharge. No axillary or supraclavicular adenopathy. No masses or nodularity palpable.  Abdomen: Soft, non tender, and without distention. No rebound, guarding, masses   : Perineum and external genitalia normal without rash. Vagina with normal and physiologic discharge. Cervix without visible lesions or discharge. Bimanual exam without adnexal masses or cervical motion tenderness.  Extremities: No cyanosis, clubbing, erythema, nor edema. Distal pulses intact and symmetric.   Musculoskeletal: All major joints AROM full in all directions without pain.  Neurological: Alert and oriented x 3. Grossly non-focal.   Psychiatric:  Behavior, mood, and affect are appropriate.    Assessment and Plan:       47 y.o.  here for annual exam, doing well     -Pap smear sent  -STD screening declined  -Vaccines are encouraged Tdap  -BC: vasectomy  -Mammogram: complete, needs short term f/u given density in breast.   -We discussed slight increased in breast CA in women who are on HRT both E/P. Pt sounds like she was placed on this for mood stabilization as opposed to vasomotor indications which is it's primary indication. We discussed that is she is not feeling worse off the estrogen she likely doesn't need it and then she could also stop the progesterone. Pt is aware since she is still menstruating she does have her natural bodies hormones currently in place. We also discussed FSH and other hormonal testing not generally needed on a routine basis.   -RTC in 1 year for annual exam or sooner prn

## 2022-07-03 DIAGNOSIS — Z01.419 WOMEN'S ANNUAL ROUTINE GYNECOLOGICAL EXAMINATION: ICD-10-CM

## 2022-07-05 LAB
CYTOLOGY REG CYTOL: NORMAL
HPV HR 12 DNA CVX QL NAA+PROBE: NEGATIVE
HPV16 DNA SPEC QL NAA+PROBE: NEGATIVE
HPV18 DNA SPEC QL NAA+PROBE: NEGATIVE
SPECIMEN SOURCE: NORMAL

## 2022-07-05 NOTE — PROGRESS NOTES
Subjective:     CC:   Chief Complaint   Patient presents with    Annual Exam       HPI:   Dolores Alberto is a 47 y.o. female who presents for annual exam    Patient has GYN provider: Yes  Last Pap Smear: 22   Last Mammogram: 22, plan for repeat in 6 months  Last Colorectal Cancer Screening: never  Last Tdap: unknown to patient, she declines today    Pt is seeing a NP that specializes in natural medicine or the last 1.5 years due to having feeling of severe PMS.  She has been using estradiol patches but has paused this due to dense breats.  She is taking progesterone.  She has paused taking testosterone.  She has stopped taking NP thyroid and is taking oral iodine instead with plan to follow-up with naturopath.    Exercise: regular walking and hiking  Diet: well balanced     Patient's last menstrual period was 2022.  Birth control: none  Menses every month with 3 days with moderate bleeding.  Denies cramping.  No significant bloating/fluid retention, pelvic pain, or dyspareunia. No abnormal vaginal discharge.  No breast tenderness, mass, nipple discharge, changes in size or contour, or abnormal cyclic discomfort.    OB History    Para Term  AB Living   2 0 0 0 2 0   SAB IAB Ectopic Molar Multiple Live Births   0 2 0 0 0 0      She  has no history on file for sexual activity.    She  has no past medical history of Addisons disease ([facility]), Adrenal disorder ([facility]), Allergy, Anemia, Anxiety, Arrhythmia, Arthritis, Asthma, Blood transfusion without reported diagnosis, Cancer ([facility]), Cataract, CHF (congestive heart failure) ([facility]), Clotting disorder ([facility]), COPD (chronic obstructive pulmonary disease) ([facility]), Cushings syndrome ([facility]), Depression, Diabetes ([facility]), Diabetic neuropathy (HCC), GERD (gastroesophageal reflux disease), Glaucoma, Goiter, Head ache, Heart attack (HCC), Heart murmur, HIV (human immunodeficiency virus infection) ([facility]), Hyperlipidemia, Hypertension, IBD (inflammatory bowel  disease), Kidney disease, Meningitis, Migraine, Muscle disorder, Osteoporosis, Parathyroid disorder (HCC), Pituitary disease (HCC), Pulmonary emphysema (HCC), Seizure (HCC), Sickle cell disease (HCC), Stroke (HCC), Substance abuse (HCC), Thyroid disease, Tuberculosis, or Urinary tract infection.  She  has a past surgical history that includes cholecystectomy (2015).    Family History   Problem Relation Age of Onset    Hypertension Mother     Stroke Father 70    Diabetes Father     Heart Disease Maternal Grandmother     Heart Disease Maternal Grandfather     Stroke Paternal Grandfather     Cancer Neg Hx      Social History     Tobacco Use    Smoking status: Never Smoker    Smokeless tobacco: Never Used   Vaping Use    Vaping Use: Never used   Substance Use Topics    Alcohol use: Yes     Comment: 2 kombucha drinks    Drug use: Never       Patient Active Problem List    Diagnosis Date Noted    Hormone replacement therapy 07/01/2022    Breast density 07/01/2022    Acute pain of right shoulder 11/30/2020    Wrist lesion 09/21/2020    PCOS (polycystic ovarian syndrome) 09/21/2020    Screening for tuberculosis 09/21/2020     Current Outpatient Medications   Medication Sig Dispense Refill    vitamin D3 (CHOLECALCIFEROL) 1000 Unit (25 mcg) Tab Take 2,500 Units by mouth every day.      gabapentin (NEURONTIN) 600 MG tablet Take 600 mg by mouth at bedtime.      progesterone (PROMETRIUM) 100 MG Cap TAKE 1 CAPSULE BY MOUTH AT BEDTIME ON DAYS 14-21 AND TAKE 1 CAPSULE TWICE A DAY ON DAYS 22-28      L-METHYLFOLATE PO Take 1,700 mg by mouth every day.      Selenium 200 MCG Tab Take 200 mcg by mouth every day.      Ascorbic Acid (VITAMIN C) 1000 MG Tab Take  by mouth.      DHEA 25 MG Tab Take 25 mg by mouth every day.      estradiol (VIVELLE) 0.0375 MG/24HR patch APPLY TRANSDERMALLY TWICE A WEEK AS DIRECTED (Patient not taking: Reported on 7/1/2022)      cyclobenzaprine (FLEXERIL) 10 mg Tab Take 1 Tablet by mouth 3 times a day as  "needed. 30 Tablet 0     No current facility-administered medications for this visit.     Allergies   Allergen Reactions    Latex Rash     rash       Review of Systems   Constitutional: Negative for fever, chills and malaise/fatigue.   HENT: Negative for congestion.    Eyes: Negative for pain.   Respiratory: Negative for cough and shortness of breath.    Cardiovascular: Negative for chest pain and leg swelling.   Gastrointestinal: Negative for nausea, vomiting, abdominal pain and diarrhea.   Genitourinary: Negative for dysuria and hematuria.   Skin: Negative for rash.   Neurological: Negative for dizziness, focal weakness and headaches.   Endo/Heme/Allergies: Does not bruise/bleed easily.   Psychiatric/Behavioral: Negative for depression.  The patient is not nervous/anxious.      Objective:   /72 (BP Location: Left arm, Patient Position: Sitting, BP Cuff Size: Adult)   Pulse 78   Temp 36.9 °C (98.5 °F) (Temporal)   Resp 12   Ht 1.702 m (5' 7\")   Wt 94.3 kg (207 lb 14.3 oz)   LMP 06/24/2022   SpO2 96%   BMI 32.56 kg/m²     Wt Readings from Last 4 Encounters:   07/06/22 94.3 kg (207 lb 14.3 oz)   07/01/22 93.1 kg (205 lb 3.2 oz)   04/19/22 81.6 kg (180 lb)   04/01/22 81.6 kg (180 lb)       Physical Exam:  Constitutional: Well-developed and well-nourished. Not diaphoretic. No distress.   Skin: Skin is warm and dry. No rash noted.  Head: Atraumatic without lesions.  Eyes: Conjunctivae and extraocular motions are normal. Pupils are equal, round, and reactive to light. No scleral icterus.   Ears:  External ears unremarkable. Tympanic membranes clear and intact.  Nose/mouth/throat: nasal and OP examinations deferred given COVID19 exposure risk  Neck: Supple, trachea midline. Normal range of motion. No thyromegaly present. No lymphadenopathy--cervical or supraclavicular.  Cardiovascular: Regular rate and rhythm, S1 and S2 without murmur, rubs, or gallops.    Respiratory: Effort normal. Clear to auscultation " throughout. No adventitious sounds.   Abdomen: Soft, non tender, and without distention. Active bowel sounds. No rebound, guarding, masses or HSM.  Extremities: No cyanosis, clubbing, erythema, nor edema. Distal pulses intact and symmetric.   Musculoskeletal: All major joints AROM full in all directions without pain.  Neurological: Alert and oriented x 3. Grossly non-focal. Strength and sensation grossly intact.   Psychiatric:  Behavior, mood, and affect are appropriate.    Assessment and Plan:     1. Well woman exam (no gynecological exam)  Health maintenance per below.  Labs ordered.  Hormone labs tested given her current medications.  Patient to follow-up with her NP regarding hormonal supplements and replacement.  - ESTRADIOL; Future  - FSH; Future  - LUTEINIZING HORMONE SERUM; Future  - TESTOSTERONE F&T FEMALES/CHILD; Future  - FREE THYROXINE; Future  - TRIIDOTHYRONINE; Future  - TSH; Future  - VITAMIN D,25 HYDROXY; Future  - CBC WITH DIFFERENTIAL; Future  - Comp Metabolic Panel; Future  - Lipid Profile; Future  - PROGESTERONE; Future    2. Screen for colon cancer  Asymptomatic.  Screening options discussed.  Patient desired Cologuard.  Lab ordered  - COLOGUARD (FIT DNA)    3. Hormone replacement therapy  Chronic issue for which she is seeing a naturopath who is adjusting medications for her.  Labs ordered as per #1      Health maintenance:     Labs per orders  Immunizations per orders. Pt declined Tdap  Patient counseled about skin care, diet, supplements, and exercise.  Discussed  diet and exercise     Follow-up: Return in about 1 year (around 7/6/2023) for Annual.

## 2022-07-06 ENCOUNTER — OFFICE VISIT (OUTPATIENT)
Dept: MEDICAL GROUP | Facility: MEDICAL CENTER | Age: 47
End: 2022-07-06
Payer: COMMERCIAL

## 2022-07-06 VITALS
OXYGEN SATURATION: 96 % | DIASTOLIC BLOOD PRESSURE: 72 MMHG | TEMPERATURE: 98.5 F | RESPIRATION RATE: 12 BRPM | HEART RATE: 78 BPM | WEIGHT: 207.89 LBS | BODY MASS INDEX: 32.63 KG/M2 | SYSTOLIC BLOOD PRESSURE: 116 MMHG | HEIGHT: 67 IN

## 2022-07-06 DIAGNOSIS — Z00.00 WELL WOMAN EXAM (NO GYNECOLOGICAL EXAM): ICD-10-CM

## 2022-07-06 DIAGNOSIS — Z12.11 SCREEN FOR COLON CANCER: ICD-10-CM

## 2022-07-06 DIAGNOSIS — Z79.890 HORMONE REPLACEMENT THERAPY: ICD-10-CM

## 2022-07-06 PROCEDURE — 99396 PREV VISIT EST AGE 40-64: CPT | Performed by: FAMILY MEDICINE

## 2022-07-06 RX ORDER — VITAMIN B COMPLEX
2500 TABLET ORAL DAILY
COMMUNITY

## 2022-07-06 RX ORDER — GABAPENTIN 600 MG/1
600 TABLET ORAL
COMMUNITY
Start: 2022-06-22

## 2022-07-06 ASSESSMENT — PATIENT HEALTH QUESTIONNAIRE - PHQ9: CLINICAL INTERPRETATION OF PHQ2 SCORE: 0

## 2022-07-12 ENCOUNTER — PATIENT MESSAGE (OUTPATIENT)
Dept: MEDICAL GROUP | Facility: MEDICAL CENTER | Age: 47
End: 2022-07-12
Payer: COMMERCIAL

## 2022-07-12 LAB
25(OH)D3+25(OH)D2 SERPL-MCNC: 35.1 NG/ML (ref 30–100)
ALBUMIN SERPL-MCNC: 4.1 G/DL (ref 3.8–4.8)
ALBUMIN/GLOB SERPL: 1.6 {RATIO} (ref 1.2–2.2)
ALP SERPL-CCNC: 58 IU/L (ref 44–121)
ALT SERPL-CCNC: 20 IU/L (ref 0–32)
AST SERPL-CCNC: 22 IU/L (ref 0–40)
BASOPHILS # BLD AUTO: 0.1 X10E3/UL (ref 0–0.2)
BASOPHILS NFR BLD AUTO: 1 %
BILIRUB SERPL-MCNC: 0.4 MG/DL (ref 0–1.2)
BUN SERPL-MCNC: 12 MG/DL (ref 6–24)
BUN/CREAT SERPL: 14 (ref 9–23)
CALCIUM SERPL-MCNC: 9 MG/DL (ref 8.7–10.2)
CHLORIDE SERPL-SCNC: 105 MMOL/L (ref 96–106)
CHOLEST SERPL-MCNC: 195 MG/DL (ref 100–199)
CO2 SERPL-SCNC: 20 MMOL/L (ref 20–29)
CREAT SERPL-MCNC: 0.86 MG/DL (ref 0.57–1)
EGFRCR SERPLBLD CKD-EPI 2021: 83 ML/MIN/1.73
EOSINOPHIL # BLD AUTO: 0.1 X10E3/UL (ref 0–0.4)
EOSINOPHIL NFR BLD AUTO: 2 %
ERYTHROCYTE [DISTWIDTH] IN BLOOD BY AUTOMATED COUNT: 12.5 % (ref 11.7–15.4)
ESTRADIOL SERPL-MCNC: 69.8 PG/ML
FSH SERPL-ACNC: 6.1 MIU/ML
GLOBULIN SER CALC-MCNC: 2.6 G/DL (ref 1.5–4.5)
GLUCOSE SERPL-MCNC: 94 MG/DL (ref 65–99)
HCT VFR BLD AUTO: 46.3 % (ref 34–46.6)
HDLC SERPL-MCNC: 48 MG/DL
HGB BLD-MCNC: 15.7 G/DL (ref 11.1–15.9)
IMM GRANULOCYTES # BLD AUTO: 0 X10E3/UL (ref 0–0.1)
IMM GRANULOCYTES NFR BLD AUTO: 0 %
IMMATURE CELLS  115398: ABNORMAL
LABORATORY COMMENT REPORT: ABNORMAL
LDLC SERPL CALC-MCNC: 129 MG/DL (ref 0–99)
LH SERPL-ACNC: 7.3 MIU/ML
LYMPHOCYTES # BLD AUTO: 1.9 X10E3/UL (ref 0.7–3.1)
LYMPHOCYTES NFR BLD AUTO: 30 %
MCH RBC QN AUTO: 33.8 PG (ref 26.6–33)
MCHC RBC AUTO-ENTMCNC: 33.9 G/DL (ref 31.5–35.7)
MCV RBC AUTO: 100 FL (ref 79–97)
MONOCYTES # BLD AUTO: 0.6 X10E3/UL (ref 0.1–0.9)
MONOCYTES NFR BLD AUTO: 10 %
MORPHOLOGY BLD-IMP: ABNORMAL
NEUTROPHILS # BLD AUTO: 3.6 X10E3/UL (ref 1.4–7)
NEUTROPHILS NFR BLD AUTO: 57 %
NRBC BLD AUTO-RTO: ABNORMAL %
PLATELET # BLD AUTO: 264 X10E3/UL (ref 150–450)
POTASSIUM SERPL-SCNC: 4.1 MMOL/L (ref 3.5–5.2)
PROGEST SERPL-MCNC: 5.1 NG/ML
PROT SERPL-MCNC: 6.7 G/DL (ref 6–8.5)
RBC # BLD AUTO: 4.65 X10E6/UL (ref 3.77–5.28)
SODIUM SERPL-SCNC: 139 MMOL/L (ref 134–144)
T4 FREE SERPL-MCNC: 1.12 NG/DL (ref 0.82–1.77)
T4 SERPL-MCNC: 8.4 UG/DL (ref 4.5–12)
TESTOST FREE SERPL-MCNC: 1.5 PG/ML (ref 0–4.2)
TESTOST SERPL-MCNC: 35 NG/DL (ref 4–50)
TRIGL SERPL-MCNC: 98 MG/DL (ref 0–149)
TSH SERPL DL<=0.005 MIU/L-ACNC: 3.14 UIU/ML (ref 0.45–4.5)
VLDLC SERPL CALC-MCNC: 18 MG/DL (ref 5–40)
WBC # BLD AUTO: 6.3 X10E3/UL (ref 3.4–10.8)

## 2022-12-02 ENCOUNTER — PATIENT MESSAGE (OUTPATIENT)
Dept: MEDICAL GROUP | Facility: MEDICAL CENTER | Age: 47
End: 2022-12-02
Payer: COMMERCIAL

## 2022-12-02 DIAGNOSIS — R92.8 ABNORMAL MAMMOGRAM: ICD-10-CM

## 2022-12-27 ENCOUNTER — HOSPITAL ENCOUNTER (OUTPATIENT)
Dept: RADIOLOGY | Facility: MEDICAL CENTER | Age: 47
End: 2022-12-27
Attending: FAMILY MEDICINE
Payer: COMMERCIAL

## 2022-12-27 DIAGNOSIS — R92.8 ABNORMAL MAMMOGRAM: ICD-10-CM

## 2022-12-27 PROCEDURE — G0279 TOMOSYNTHESIS, MAMMO: HCPCS

## 2022-12-27 PROCEDURE — 76642 ULTRASOUND BREAST LIMITED: CPT | Mod: LT

## 2023-04-05 ENCOUNTER — APPOINTMENT (OUTPATIENT)
Dept: MEDICAL GROUP | Facility: MEDICAL CENTER | Age: 48
End: 2023-04-05
Payer: COMMERCIAL

## 2023-05-25 ENCOUNTER — PATIENT MESSAGE (OUTPATIENT)
Dept: SLEEP MEDICINE | Facility: MEDICAL CENTER | Age: 48
End: 2023-05-25
Payer: COMMERCIAL

## 2023-08-22 ENCOUNTER — HOSPITAL ENCOUNTER (OUTPATIENT)
Dept: RADIOLOGY | Facility: MEDICAL CENTER | Age: 48
End: 2023-08-22
Attending: INTERNAL MEDICINE
Payer: COMMERCIAL

## 2023-08-22 DIAGNOSIS — Z12.31 VISIT FOR SCREENING MAMMOGRAM: ICD-10-CM

## 2023-08-22 PROCEDURE — 77063 BREAST TOMOSYNTHESIS BI: CPT

## 2024-08-23 ENCOUNTER — HOSPITAL ENCOUNTER (OUTPATIENT)
Dept: RADIOLOGY | Facility: MEDICAL CENTER | Age: 49
End: 2024-08-23
Attending: STUDENT IN AN ORGANIZED HEALTH CARE EDUCATION/TRAINING PROGRAM
Payer: COMMERCIAL

## 2024-08-23 DIAGNOSIS — Z12.31 VISIT FOR SCREENING MAMMOGRAM: ICD-10-CM

## 2024-08-23 PROCEDURE — 77067 SCR MAMMO BI INCL CAD: CPT

## 2025-07-09 ENCOUNTER — OFFICE VISIT (OUTPATIENT)
Dept: OBGYN | Facility: CLINIC | Age: 50
End: 2025-07-09
Payer: COMMERCIAL

## 2025-07-09 VITALS — SYSTOLIC BLOOD PRESSURE: 108 MMHG | DIASTOLIC BLOOD PRESSURE: 76 MMHG | WEIGHT: 194 LBS | BODY MASS INDEX: 30.38 KG/M2

## 2025-07-09 DIAGNOSIS — N92.1 MENORRHAGIA WITH IRREGULAR CYCLE: ICD-10-CM

## 2025-07-09 DIAGNOSIS — Z12.31 ENCOUNTER FOR SCREENING MAMMOGRAM FOR MALIGNANT NEOPLASM OF BREAST: ICD-10-CM

## 2025-07-09 DIAGNOSIS — R92.30 DENSE BREAST TISSUE: ICD-10-CM

## 2025-07-09 DIAGNOSIS — Z87.42 HISTORY OF PCOS: ICD-10-CM

## 2025-07-09 DIAGNOSIS — Z01.419 WOMEN'S ANNUAL ROUTINE GYNECOLOGICAL EXAMINATION: Primary | ICD-10-CM

## 2025-07-09 PROCEDURE — 99459 PELVIC EXAMINATION: CPT | Performed by: STUDENT IN AN ORGANIZED HEALTH CARE EDUCATION/TRAINING PROGRAM

## 2025-07-09 PROCEDURE — 3074F SYST BP LT 130 MM HG: CPT | Performed by: STUDENT IN AN ORGANIZED HEALTH CARE EDUCATION/TRAINING PROGRAM

## 2025-07-09 PROCEDURE — 3078F DIAST BP <80 MM HG: CPT | Performed by: STUDENT IN AN ORGANIZED HEALTH CARE EDUCATION/TRAINING PROGRAM

## 2025-07-09 PROCEDURE — 99386 PREV VISIT NEW AGE 40-64: CPT | Performed by: STUDENT IN AN ORGANIZED HEALTH CARE EDUCATION/TRAINING PROGRAM

## 2025-07-09 RX ORDER — IBUPROFEN 800 MG/1
800 TABLET, FILM COATED ORAL EVERY 8 HOURS PRN
Qty: 10 TABLET | Refills: 0 | Status: SHIPPED | OUTPATIENT
Start: 2025-07-09

## 2025-07-09 NOTE — PROGRESS NOTES
ANNUAL GYNECOLOGY VISIT    Chief Complaint  Annual Exam  New Patient      Subjective  Dolores Owens is a 50 y.o. female  Patient's last menstrual period was 2025. Presents today for Annual Exam.       Other concerns/complaints:    #Hx of PCOS: Pt with hx of PCOS. She is currently being followed by PA at [facility] and is on bio-idential hormones. Currently uses estrogen patch and was increased to progesterone 200 mg QHS to help with sleep at night.    #Heavy menstrual bleeding: She would also like to discuss her heavy menstrual bleeding.  She has known history of PCOS and is currently on a GLP which has helped her lose weight.  In the last year she has lost about 8 pounds.  She currently does have regular menstrual cycles every 32 to 33 days that lasts about 4 to 5 days.  She does notice in the first few days that her menstrual cycle is fairly heavy.  She discussed with her PA about potentially getting a Mirena IUD to help with her menstrual cycles as well as for endometrial protection.  She previously had a copper IUD and tolerated it well.      Occupation:       Preventive Care   Immunization History   Administered Date(s) Administered    Influenza Vaccine Quad Inj (Pf) 2020, 10/08/2021    Tuberculin Skin Test 2020     Last Pap: 1 year ago and normal  Any abnormal pap smears?  no  Last Mammogram: 1 year ago  Last Colonoscopy: colonscopy within the year and told 10 year follow up  Last DEXA: n/a  Immunizations: all desired UTD      Gynecology History  Current Sexual Activity: yes - with  who has vasectomy  Currently in a relationship: yes  Feel safe in your current relationship: yes  History of sexually transmitted diseases? no  Abnormal discharge? no  Menopause: no  Postmenopausal bleeding: n/a    Menstrual History  Patient's last menstrual period was 2025.  Periods are regular every 32-33days, lasting 4-5 days. Heavy the first two days.   Dysmenorrhea :none. Cyclic  symptoms include . No intermenstrual bleeding, spotting, or discharge.  PMS symptoms?  Mood changes, bloating    Contraception  Current: vasectomy  Past: iud      Cancer Risk Assessement:  Family history of:   - Breast cancer: no   - Ovarian cancer: no   - Uterine cancer: no   - Colon cancer: no    Obstetric History  OB History    Para Term  AB Living   2    2    SAB IAB Ectopic Molar Multiple Live Births    2          # Outcome Date GA Lbr Fam/2nd Weight Sex Type Anes PTL Lv   2 IAB            1 IAB                Past Medical History  Past Medical History[1]    Past Surgical History  Past Surgical History[2]    Social History  Social History[3]     Family History  Family History   Problem Relation Age of Onset    Hypertension Mother     Stroke Father 70    Diabetes Father     Heart Disease Maternal Grandmother     Heart Disease Maternal Grandfather     Stroke Paternal Grandfather     Cancer Neg Hx        Home Medications  Current Outpatient Medications   Medication Sig    meloxicam (MOBIC) 15 MG tablet TAKE 1 TABLET BY MOUTH EVERY DAY    cyclobenzaprine (FLEXERIL) 5 mg tablet Take 1-2 Tablets by mouth at bedtime as needed for Muscle Spasms.    methylPREDNISolone (MEDROL DOSEPAK) 4 MG Tablet Therapy Pack Follow schedule on package instructions.    gabapentin (NEURONTIN) 600 MG tablet Take 600 mg by mouth at bedtime.    vitamin D3 (CHOLECALCIFEROL) 1000 Unit (25 mcg) Tab Take 2,500 Units by mouth every day.    progesterone (PROMETRIUM) 100 MG Cap TAKE 1 CAPSULE BY MOUTH AT BEDTIME ON DAYS 14-21 AND TAKE 1 CAPSULE TWICE A DAY ON DAYS 22-28    L-METHYLFOLATE PO Take 1,700 mg by mouth every day.    Selenium 200 MCG Tab Take 200 mcg by mouth every day.    Ascorbic Acid (VITAMIN C) 1000 MG Tab Take  by mouth.    DHEA 25 MG Tab Take 25 mg by mouth every day.    estradiol (VIVELLE) 0.0375 MG/24HR patch APPLY TRANSDERMALLY TWICE A WEEK AS DIRECTED (Patient not taking: Reported on 2022)    cyclobenzaprine  (FLEXERIL) 10 mg Tab Take 1 Tablet by mouth 3 times a day as needed.       Allergies/Reactions  Allergies[4]    ROS  Review of Systems: see HPI  Gen: no fevers or chills, no significant weight loss or gain  Respiratory:  no cough or dyspnea  Cardiac:  no chest pain, no palpitations, no syncope  GI:  no heartburn, no abdominal pain, no nausea or vomiting  Psych: no depression or anxiety    Objective  /76   Wt 194 lb   LMP 07/06/2025   BMI 30.38 kg/m²     Constitutional: The patient is well developed and well nourished.  Psychiatric: Patient is oriented to time place and person.   Skin: No rash observed.  Neck: Appears symmetric. Thyroid normal size  Respiratory: normal effort  Breast: Inspection reveals no asymetry or nipple discharge, no skin thickening, dimpling or erythema.  Palpation demonstrates no masses.  Abdomen: Soft, non-tender.  Pelvic Exam:      Vulva: external female genitalia are normal in appearance. No lesions     Urethra - no lesions, no erythema     Vagina: moist, pink, normal ruggae     Cervix: pink, smooth, no lesions, no CMT     Uterus - non-tender, normal size, shape, contour, mobile, anteverted     Ovaries: non-tender, no appreciable masses   Pap Smear performed: No   Chaperone Present: Brina Valdez MA  Extremities: Legs are symmetric and without tenderness. There is no edema present.      Assessment & Plan  Dolores Owens is a 50 y.o. female who presents today for Annual Gyn Exam.   Assessment & Plan  Women's annual routine gynecological examination  PAP: UTD and done one year at [facility] will have pt sign CHIRAG  STI Screen (HIV, Syphilis, Chlamydia / Gonorrhea): declined  MAMMOGRAM: ordered today  COLONOSCOPY: cologuard done 08/2022  DEXA: n/a  IMMUNIZATIONS: all desired UTD  TOBACCO: declines use  DIABETES SCREEN: followed by PCP  CHOLESTEROL SCREEN: followed by PCP  COUNSELING: breast self exam, menopause, osteoporosis, and adequate intake of calcium and vitamin D        Encounter for screening mammogram for malignant neoplasm of breast  Mammogram and US ordered for annual screening.   Orders:    MA-SCREENING MAMMO BILAT W/TOMOSYNTHESIS W/CAD; Future    Dense breast tissue    Orders:    US-BREAST BILAT-COMPLETE; Future    Menorrhagia with irregular cycle  Pt with hx of PCOS. Does have regular menstrual cycles, but states that her menses are very heavy and last 4-5 days. Previously did have copper IUD and states that she did not have any adverse effects with it. She did talk to her PCP about possible Mirena placement. We discussed that this can help with endometrial protection as well as help control her bleeding.   Today the patient is counseled on procedure of IUD insertion. I discussed with the patient the risks of IUD including infection, risk of IUD expulsion, the risk of uterine perforation (1-2/1000, slightly higher while breastfeeding), risk of IUD migration or lost strings.  If the IUD does migrate the patient may require a separate procedure such as hysteroscopy or laparoscopy to remove or retrieve the migrated IUD. I also discussed the 0.1% risk of pregnancy with IUD use which coincides with an increased risk of ectopic pregnancy with IUD use.  We reviewed leaving the strings long enough to prevent disappearance however this may initially result in the possibility that partner can feel the IUD during intercourse; this typically resolves as the strings soften and tuck behind cervix. I also discussed the side effects of progestin-containing IUDs including decreased, irregular or absent menses and/or spotting.  All questions answered.          History of PCOS  Pt with hx of PCOS and is followed by her PCP for this.                Return: Annually or PRN    Sonia Leal P.A.-C.  7/9/2025         [1] History reviewed. No pertinent past medical history.  [2]   Past Surgical History:  Procedure Laterality Date    CHOLECYSTECTOMY  2015   [3]   Social History  Tobacco  Use    Smoking status: Never    Smokeless tobacco: Never   Vaping Use    Vaping status: Never Used   Substance Use Topics    Alcohol use: Yes     Comment: 2 kombucha drinks    Drug use: Never   [4]   Allergies  Allergen Reactions    Latex Rash     rash

## 2025-07-29 ENCOUNTER — APPOINTMENT (OUTPATIENT)
Dept: OBGYN | Facility: CLINIC | Age: 50
End: 2025-07-29
Payer: COMMERCIAL

## 2025-08-22 ENCOUNTER — HOSPITAL ENCOUNTER (OUTPATIENT)
Dept: RADIOLOGY | Facility: MEDICAL CENTER | Age: 50
End: 2025-08-22
Payer: COMMERCIAL

## 2025-08-26 ENCOUNTER — APPOINTMENT (OUTPATIENT)
Facility: MEDICAL CENTER | Age: 50
End: 2025-08-26
Attending: STUDENT IN AN ORGANIZED HEALTH CARE EDUCATION/TRAINING PROGRAM
Payer: COMMERCIAL